# Patient Record
(demographics unavailable — no encounter records)

---

## 2025-02-13 NOTE — IMAGING
[Bilateral] : knee bilaterally [There are no fractures, subluxations or dislocations. No significant abnormalities are seen] : There are no fractures, subluxations or dislocations. No significant abnormalities are seen [de-identified] :   ----------------------------------------------------------------------------   Bilateral knee exam:   Skin: no significant pertinent finding Inspection:      (neg) Effusion      (neg) Malalignment      (neg) Swelling      (neg) Quad atrophy      (neg) J-sign ROM:      0 - 135 degrees of flexion. Tenderness:      (+) mild  MJLT      (neg) LJLT      (+) Medial patellar facet tenderness      (neg) Lateral patellar facet tenderness      (neg) Crepitus      (neg) Patellar grind tenderness      (neg) Patellar tendon      (neg) Quad tendon      Other: Stability:      (neg) Lachman      (neg) Varus/Valgus instability      (neg) Posterior drawer      (neg) Patellar translation: wnl Additional tests:      (neg) McMurrays test      (neg) Patellar apprehension      Other: Strength: 5/5 Q/H/TA/GS/EHL Neuro: In tact to light touch throughout, DTR's wnl Vascularity: Extremity warm and well perfused Gait: normal    [FreeTextEntry9] : mild lateral patella tilt

## 2025-02-13 NOTE — DISCUSSION/SUMMARY
[Medication Risks Reviewed] : Medication risks reviewed [de-identified] :  Plan for HEP dispensed PDF  in office  Rx: Naproxen oral suspension ----------------------------------------------------------------------------   All relevant imaging studies pertinent to today's visit, including x-rays, MRI's and/or other advanced imaging studies (CT/etc) were independently interpreted and reviewed with the patient as needed. Implications of the studies together with the patient's clinical picture were discussed to formulate a working diagnosis and management options were detailed.   The patient and/or guardian was advised of the diagnosis.  The natural history of the pathology was explained in full. All questions were answered.  The risks and benefits of conservative and interventional treatment alternatives were explained to the patient   The patient and/or guardian was advised if any advanced diagnostic/imaging study (MRI/CT/etc) is ordered to evaluate potential pathology in the affected area(s), they should follow up in the office to review the results of the study and determine further management that may be indicated.   ----------------------------------------------------------------------------   Patient warned of specific risks of medication related to bleeding, GI issues, increase blood pressure, and cardiac risks in addition to additional risks.  Patient advised to discuss with PMD  if any presence of stated issues.

## 2025-02-13 NOTE — HISTORY OF PRESENT ILLNESS
[0] : 0 [Dull/Aching] : dull/aching [Intermittent] : intermittent [Leisure] : leisure [Work] : work [Bending/leaning forward] : bending/leaning forward [Bending forward] : bending forward [de-identified] : Patient is a 22-year-old female complaining of bilateral knee pain. She states pain started on  2/2/25 both knees with no injury. Describes the pain describes as pressure and it radiates to the calf  when she bends. Denies any prior trauma or injury on the knees . no PT or medication for the knees . WB in sneakers. Pain when she gets up from sitting, squatting and stairs. Works in a school [] : no [FreeTextEntry1] : bilateral knee pain